# Patient Record
Sex: MALE | Race: WHITE | NOT HISPANIC OR LATINO | Employment: OTHER | URBAN - METROPOLITAN AREA
[De-identification: names, ages, dates, MRNs, and addresses within clinical notes are randomized per-mention and may not be internally consistent; named-entity substitution may affect disease eponyms.]

---

## 2023-07-12 LAB
ALANINE AMINOTRANSFERASE (SGPT) (U/L) IN SER/PLAS: 12 U/L (ref 10–52)
ALBUMIN (G/DL) IN SER/PLAS: 4.4 G/DL (ref 3.4–5)
ALKALINE PHOSPHATASE (U/L) IN SER/PLAS: 60 U/L (ref 33–136)
ANION GAP IN SER/PLAS: 13 MMOL/L (ref 10–20)
APPEARANCE, URINE: CLEAR
ASPARTATE AMINOTRANSFERASE (SGOT) (U/L) IN SER/PLAS: 16 U/L (ref 9–39)
BILIRUBIN TOTAL (MG/DL) IN SER/PLAS: 0.5 MG/DL (ref 0–1.2)
BILIRUBIN, URINE: NEGATIVE
BLOOD, URINE: NEGATIVE
CALCIDIOL (25 OH VITAMIN D3) (NG/ML) IN SER/PLAS: 55 NG/ML
CALCIUM (MG/DL) IN SER/PLAS: 10 MG/DL (ref 8.6–10.6)
CARBON DIOXIDE, TOTAL (MMOL/L) IN SER/PLAS: 30 MMOL/L (ref 21–32)
CHLORIDE (MMOL/L) IN SER/PLAS: 101 MMOL/L (ref 98–107)
CHOLESTEROL (MG/DL) IN SER/PLAS: 156 MG/DL (ref 0–199)
CHOLESTEROL IN HDL (MG/DL) IN SER/PLAS: 83.5 MG/DL
CHOLESTEROL/HDL RATIO: 1.9
COLOR, URINE: YELLOW
CREATININE (MG/DL) IN SER/PLAS: 1 MG/DL (ref 0.5–1.3)
ERYTHROCYTE DISTRIBUTION WIDTH (RATIO) BY AUTOMATED COUNT: 14.8 % (ref 11.5–14.5)
ERYTHROCYTE MEAN CORPUSCULAR HEMOGLOBIN CONCENTRATION (G/DL) BY AUTOMATED: 32.8 G/DL (ref 32–36)
ERYTHROCYTE MEAN CORPUSCULAR VOLUME (FL) BY AUTOMATED COUNT: 93 FL (ref 80–100)
ERYTHROCYTES (10*6/UL) IN BLOOD BY AUTOMATED COUNT: 4.89 X10E12/L (ref 4.5–5.9)
FERRITIN (UG/LL) IN SER/PLAS: 25 UG/L (ref 20–300)
GFR MALE: 80 ML/MIN/1.73M2
GLUCOSE (MG/DL) IN SER/PLAS: 88 MG/DL (ref 74–99)
GLUCOSE, URINE: NEGATIVE MG/DL
HEMATOCRIT (%) IN BLOOD BY AUTOMATED COUNT: 45.7 % (ref 41–52)
HEMOGLOBIN (G/DL) IN BLOOD: 15 G/DL (ref 13.5–17.5)
IRON (UG/DL) IN SER/PLAS: 67 UG/DL (ref 35–150)
IRON BINDING CAPACITY (UG/DL) IN SER/PLAS: 444 UG/DL (ref 240–445)
IRON SATURATION (%) IN SER/PLAS: 15 % (ref 25–45)
KETONES, URINE: NEGATIVE MG/DL
LDL: 62 MG/DL (ref 0–99)
LEUKOCYTE ESTERASE, URINE: NEGATIVE
LEUKOCYTES (10*3/UL) IN BLOOD BY AUTOMATED COUNT: 6.8 X10E9/L (ref 4.4–11.3)
NITRITE, URINE: NEGATIVE
NRBC (PER 100 WBCS) BY AUTOMATED COUNT: 0 /100 WBC (ref 0–0)
PH, URINE: 6 (ref 5–8)
PLATELETS (10*3/UL) IN BLOOD AUTOMATED COUNT: 174 X10E9/L (ref 150–450)
POTASSIUM (MMOL/L) IN SER/PLAS: 5.2 MMOL/L (ref 3.5–5.3)
PROGESTERONE (NG/ML) IN SER/PLAS: 0.8 NG/ML
PROSTATE SPECIFIC AG (NG/ML) IN SER/PLAS: 3.91 NG/ML (ref 0–4)
PROTEIN TOTAL: 6.7 G/DL (ref 6.4–8.2)
PROTEIN, URINE: NEGATIVE MG/DL
RBC, URINE: <1 /HPF (ref 0–5)
SODIUM (MMOL/L) IN SER/PLAS: 139 MMOL/L (ref 136–145)
SPECIFIC GRAVITY, URINE: 1 (ref 1–1.03)
TESTOSTERONE (NG/DL) IN SER/PLAS: 550 NG/DL (ref 240–1000)
TRIGLYCERIDE (MG/DL) IN SER/PLAS: 54 MG/DL (ref 0–149)
UREA NITROGEN (MG/DL) IN SER/PLAS: 10 MG/DL (ref 6–23)
UROBILINOGEN, URINE: <2 MG/DL (ref 0–1.9)
VLDL: 11 MG/DL (ref 0–40)
WBC, URINE: <1 /HPF (ref 0–5)

## 2023-07-13 LAB — URINE CULTURE: NO GROWTH

## 2023-09-06 ENCOUNTER — HOSPITAL ENCOUNTER (OUTPATIENT)
Dept: DATA CONVERSION | Facility: HOSPITAL | Age: 73
Discharge: HOME | End: 2023-09-06
Payer: MEDICARE

## 2023-09-06 DIAGNOSIS — R42 DIZZINESS AND GIDDINESS: ICD-10-CM

## 2024-02-16 DIAGNOSIS — R51.9 NONINTRACTABLE HEADACHE, UNSPECIFIED CHRONICITY PATTERN, UNSPECIFIED HEADACHE TYPE: Primary | ICD-10-CM

## 2024-02-16 RX ORDER — SUMATRIPTAN 6 MG/.5ML
6 INJECTION, SOLUTION SUBCUTANEOUS AS NEEDED
Qty: 2 ML | Refills: 11 | Status: SHIPPED | OUTPATIENT
Start: 2024-02-16

## 2024-02-16 RX ORDER — SUMATRIPTAN 6 MG/.5ML
6 INJECTION, SOLUTION SUBCUTANEOUS AS NEEDED
COMMUNITY
End: 2024-02-16 | Stop reason: SDUPTHER

## 2024-02-27 ENCOUNTER — OFFICE VISIT (OUTPATIENT)
Dept: OTOLARYNGOLOGY | Facility: CLINIC | Age: 74
End: 2024-02-27
Payer: MEDICARE

## 2024-02-27 ENCOUNTER — CLINICAL SUPPORT (OUTPATIENT)
Dept: AUDIOLOGY | Facility: CLINIC | Age: 74
End: 2024-02-27
Payer: MEDICARE

## 2024-02-27 VITALS — WEIGHT: 117.7 LBS | TEMPERATURE: 98.2 F | HEIGHT: 65 IN | BODY MASS INDEX: 19.61 KG/M2

## 2024-02-27 DIAGNOSIS — R42 DIZZINESS: Primary | ICD-10-CM

## 2024-02-27 DIAGNOSIS — H90.3 SENSORINEURAL HEARING LOSS (SNHL) OF BOTH EARS: Primary | ICD-10-CM

## 2024-02-27 DIAGNOSIS — R42 DIZZINESS: ICD-10-CM

## 2024-02-27 DIAGNOSIS — H91.8X3 ASYMMETRICAL HEARING LOSS: ICD-10-CM

## 2024-02-27 DIAGNOSIS — H93.13 TINNITUS OF BOTH EARS: ICD-10-CM

## 2024-02-27 DIAGNOSIS — H90.3 SENSORINEURAL HEARING LOSS (SNHL) OF BOTH EARS: ICD-10-CM

## 2024-02-27 PROCEDURE — 1126F AMNT PAIN NOTED NONE PRSNT: CPT | Performed by: NURSE PRACTITIONER

## 2024-02-27 PROCEDURE — 1159F MED LIST DOCD IN RCRD: CPT | Performed by: NURSE PRACTITIONER

## 2024-02-27 PROCEDURE — 92557 COMPREHENSIVE HEARING TEST: CPT | Performed by: AUDIOLOGIST

## 2024-02-27 PROCEDURE — 1160F RVW MEDS BY RX/DR IN RCRD: CPT | Performed by: NURSE PRACTITIONER

## 2024-02-27 PROCEDURE — 99204 OFFICE O/P NEW MOD 45 MIN: CPT | Performed by: NURSE PRACTITIONER

## 2024-02-27 PROCEDURE — 92550 TYMPANOMETRY & REFLEX THRESH: CPT | Performed by: AUDIOLOGIST

## 2024-02-27 PROCEDURE — 1036F TOBACCO NON-USER: CPT | Performed by: NURSE PRACTITIONER

## 2024-02-27 ASSESSMENT — PATIENT HEALTH QUESTIONNAIRE - PHQ9
2. FEELING DOWN, DEPRESSED OR HOPELESS: NOT AT ALL
SUM OF ALL RESPONSES TO PHQ9 QUESTIONS 1 & 2: 0
1. LITTLE INTEREST OR PLEASURE IN DOING THINGS: NOT AT ALL

## 2024-02-27 NOTE — PROGRESS NOTES
"  ADULT AUDIOMETRIC EVALUATION    Name:  Robb Marie  :  1950  Age:  74 y.o.  Date of Evaluation:  2024    HISTORY:  Reason for visit: Mr. Marie is seen today for an evaluation of hearing in conjunction with seeing Kylie Gonzalez CNP. Patient was accompanied by a family member.    Normal VNG on 23. Last audiogram dated 23 revealed mild to moderate SNHL 2000 Hz-6000 Hz, severe at 8000 Hz in the right ear and moderate SNHL at 3000 Hz through 6000 Hz; Severe at 8000 Hz. Asymmetry noted at 3000 Hz     Patient reports one year history of issues with dizziness, disorientation, blurred vision, dull headache and a feeling of knees going to give out. Typically this starts about 2-3 hours after waking for the day. He reports if he closes his eyes, he gets very imbalanced.    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    EVALUATION:    See Audiogram and Immittance results under \"Media\".    RESULTS:     Otoscopic Evaluation:     RIGHT  Mild amount of non-occluding cerumen    LEFT  Mild amount of non-occluding cerumen    Immittance:   Immittance Measures: 226 Hz          Right Ear: Type A: Normal middle ear function         Left Ear:  Type A: Normal middle ear function    Reflexes and Reflex Decay:    Ipsilateral Reflexes (500-4000 Hz):          Probe/Stimulus Right Ear: present       Probe/Stimulus Left Ear: present    Audiometry:  Test Technique: Standard Audiometry under insert phones.    Reliability: Good     Pure Tone Audiometry:    Right: Mild sensorineural hearing loss 125-3000 Hz falling to moderately-severe through 8000 Hz   Left: Mild sensorineural hearing loss 125-2000 Hz falling to moderately-severe through 8000 Hz       Speech Audiometry (via recorded, 25-words unless noted; M=masked):       Right Ear: Speech Reception Threshold (SRT) was obtained at 30 dBHL  Word Recognition Scores were Excellent (100%) in quiet when words were " presented at 65 dBHL, using the NU-6 2A word list.  Left Ear: Speech Reception Threshold (SRT) was obtained at 25 dBHL  Word Recognition Scores were Excellent (96%) in quiet when words were presented at 65 dBHL, using the NU-6 3A word list.    IMPRESSIONS:  Today's test results suggest normal middle ear function.     Right: Mild sensorineural hearing loss 125-3000 Hz falling to moderately-severe through 8000 Hz with excellent word understanding.   Left: Mild sensorineural hearing loss 125-2000 Hz falling to moderately-severe through 8000 Hz with excellent word understanding.       Compared to previous testing dated 8/29/23, largely unchanged.    PATIENT EDUCATION:   Robb Marie was counseled with regard to the findings.       PLAN:  Follow up with Kylie Gonzalez CNP as directed.  Retest hearing annually; sooner if concerns or changes arise.        LOREN Perez, CCC-A  Clinical Audiologist    Time: 5330-3551    This note was created using speech recognition transcription software. Despite proofreading, several typographical errors might be present that might affect the meaning of the content. Please call with any questions.     Degree of   Hearing Sensitivity dB Range   Within Normal Limits (WNL) 0 - 20   Slight 25   Mild 26 - 40   Moderate 41 - 55   Moderately-Severe 56 - 70   Severe 71 - 90   Profound 91 +     KEY  TM Tympanic Membrane   WNL Within Normal Limits   HA Hearing Aid   SNHL Sensorineural Hearing Loss   CHL Conductive Hearing Loss   NIHL Noise-Induced Hearing Loss   ECV Ear Canal Volume

## 2024-02-27 NOTE — PROGRESS NOTES
"Subjective   Patient ID: Robb Marie is a 74 y.o. male who presents for Dizziness (Year no answer as to why dizziness- start morning for an hour dizzy, disoriented, extreme fatigue, headaches and confusion.).  HPI  This patient is referred for evaluation of  episodic non- vertiginous dizziness. The patient is accompanied by his wife. The approximate duration of his complaints is 1 year.    The patient describes his dizziness as feeling \"like I am falling into a black hole,\" fatigue, headache, confusion, unsteady, \"no equilibrium.\"  Symptoms typically last about 3 hours but have lasted an entire day.  Symptoms seem to occur in the middle of the day and resolved by dinnertime.  This has been a ongoing for 1 year.  Initially, symptoms only occurred 1-2 times per week and now occur daily.  He also reports 1 episode of syncope with fall.  He did not seek medical attention.  When asked about ear pain, headache, phono-photophobia, visual or motion intolerance, sound or pressure induced symptoms, hearing loss, discharge from ear, tinnitus, aural fullness or autophony, the patient admits to history of severe migraine headaches (which seem to resolve on their own about 1 year ago), phono and photophobia, motion intolerance, possible pressure induced dizziness, bilateral tinnitus which fluctuates in severity and occasional difficulty hearing.  Patient reports that he got good relief of his migraines with Imitrex.  His migraines had been an issue for 30+ years.  He had about 1 to 2/week and they gradually went away altogether.  He denies any chronic neck pain or stiffness.  Patient was seen by Dr. Mooney who ordered MRI IAC with and without contrast and VNG testing.  MRI IAC was negative for any mass or lesion.  VNG testing was normal.    When asked about a significant past otological history including history of prior ear surgery, noise exposure, exposure to ototoxic drugs or agents, and/or family history of hearing " loss, the patient admits to recreational noise exposure.    Review of Systems  A comprehensive or 10 points review of the patient´s constitutional, neurological, HEENT, pulmonary, cardiovascular and genito-urinary systems showed only those mentioned in history of present illness.    Objective   Physical Exam  Constitutional: no fever, chills, weight loss or weight gain   General appearance: Appears well, well-nourished, well groomed. No acute distress.   Communication: Normal communication   Psychiatric: Oriented to person, place and time. Normal mood and affect.   Neurologic: Cranial nerves II-XII grossly intact and symmetric bilaterally.   Head and Face:   Head: Atraumatic with no masses, lesions or scarring.   Face: Normal symmetry, no paralysis, synkinesis or facial tic. No scars or deformities.   TMJ: Normal, no trismus.   Eyes: Conjunctiva not edematous or erythematous   Ears: External inspection of ears with no deformity, scars or masses. Bilateral EACs clear and bilateral TMs intact with no signs of effusions     Neck: Normal appearing, symmetric, trachea midline.   Cardiovascular: Examination of peripheral vascular system shows no clubbing or cyanosis.   Respiratory: No respiratory distress increased work of breathing. Inspection of the chest with symmetric chest expansion and normal respiratory effort.   Skin: No rashes in the head or neck  Bedside occulomotor function assessment for ocular pursuits and saccades was normal.  Bilateral head thrust negative  Romberg very unsteady, patient immediately following right  Fukuda unsteady and stumbling, but no turning  Tandem gait unsteady, patient immediately following right  Spring-Hallpike negative bilaterally  My interpretation of the audiogram done today is normal/mild sensorineural hearing loss bilaterally through 2000 Hz sloping to moderate sensorineural hearing loss bilaterally.  There is left greater than right sensorineural asymmetry at 3000 Hz.  Excellent  word recognition scores and normal tympanograms bilaterally.  Assessment/Plan        This patient presents for initial evaluation of chronic acquired dizziness, bilateral/asymmetrical sensorineural hearing loss and bilateral subjective tinnitus.    Reassurance given that otologic exam today is normal.  Audiogram was reviewed in detail.  He is a borderline candidate for hearing amplification.  Previous balance testing and MRI were reviewed.  The physiology of balance control was explained. The likely possible etiologies were reviewed. I believe the patient does not have a peripheral vestibular disorder.  I believe his symptoms are more likely a migraine variant.  We discussed that it is interesting that his migraine headaches seem to resolve around the same time he developed his dizziness.  I recommended dietary modifications for migraines.  We also discussed vestibular therapy which may help.  I am happy to place a referral.  I also recommended following up with our balance neurology partners.  Patient and family are in agreement with the plan.  All questions were answered to patient and family's satisfaction.      45 minutes was spent on this patient´s visit. More than 50% of that time was spent in counseling regarding the possible etiologies, test results, treatment options and coordinating care.    This note was created using speech recognition transcription software. Despite proofreading, several typographical errors might be present that might affect the meaning of the content. Please call with any questions.      DAVID Garcia-CNP 02/27/24 6:12 PM

## 2024-03-06 ENCOUNTER — OFFICE VISIT (OUTPATIENT)
Dept: NEUROLOGY | Facility: CLINIC | Age: 74
End: 2024-03-06
Payer: MEDICARE

## 2024-03-06 VITALS
DIASTOLIC BLOOD PRESSURE: 70 MMHG | HEART RATE: 64 BPM | HEIGHT: 66 IN | RESPIRATION RATE: 12 BRPM | SYSTOLIC BLOOD PRESSURE: 130 MMHG | WEIGHT: 115 LBS | BODY MASS INDEX: 18.48 KG/M2

## 2024-03-06 DIAGNOSIS — R42 DIZZINESS: ICD-10-CM

## 2024-03-06 DIAGNOSIS — I65.23 CAROTID ATHEROSCLEROSIS, BILATERAL: Primary | ICD-10-CM

## 2024-03-06 PROCEDURE — 1160F RVW MEDS BY RX/DR IN RCRD: CPT | Performed by: PSYCHIATRY & NEUROLOGY

## 2024-03-06 PROCEDURE — 1159F MED LIST DOCD IN RCRD: CPT | Performed by: PSYCHIATRY & NEUROLOGY

## 2024-03-06 PROCEDURE — 1036F TOBACCO NON-USER: CPT | Performed by: PSYCHIATRY & NEUROLOGY

## 2024-03-06 PROCEDURE — 99205 OFFICE O/P NEW HI 60 MIN: CPT | Performed by: PSYCHIATRY & NEUROLOGY

## 2024-03-06 PROCEDURE — 1126F AMNT PAIN NOTED NONE PRSNT: CPT | Performed by: PSYCHIATRY & NEUROLOGY

## 2024-03-06 NOTE — PROGRESS NOTES
Chief complaint:    74 year old man with dizziness for one year.  Consulted by Kylie Gonzalez CNP.  PCP Dr. Slater.    HPI:    This is a 74-year-old man with dizziness episodes for approximately one year.  The patient came with his significant other, Holly, here is what they tell me.  He is having episodes of dizziness.  It comes and goes.  It often lasts for several hours.  Not exactly a apoorva dizziness, no actual spinning, more like a feeling of fuzziness or lightheadedness, along with difficulty concentrating.  Often accompanied by visual displays consisting of blurriness out in the peripheral vision or spots.  Patient has had longstanding migraines going back all of his life.  I actually had seen him many years ago for his migraines and we had him begin Imitrex which has been very helpful for him over the years.  The migraines were active up until a few years ago when they markedly improved, he now gets them only once in a great while.  However, with these episodes he sometimes has a dull headache and a couple of times he has tried using Imitrex for the symptoms and it has helped.  After a few hours though the symptoms sometimes come back.  He recently saw ENT, had brain MRI imaging, testing came out okay and so a neurological process for example migraine-related dizziness was contemplated so he was asked to revisit with neurology to get that checked out.  Additionally, the patient indicates that he sometimes takes his pulse ox, he says that usually it runs around 95 to 96%, but during the episode he often gets readings much lower, down around 88% or so.  Denies feeling short of breath.  He is a longtime smoker.      Past medical history is remarkable for 1.  Longstanding migraines, on Imitrex which helps, 2.  Macular degeneration, sees an eye doctor for that.    Current medications reviewed.  He takes eye vitamins and also uses Imitrex injectable as needed for migraines.  Allergies reviewed.    Social history the  "patient has significant-other Holly.  His wife had passed away few years ago from cancer.  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  He does smoke cigarettes about one pack/day, drinks beer at least a few per night.  Family history remarkable for stroke in his father.    /70   Pulse 64   Resp 12   Ht 1.676 m (5' 6\")   Wt 52.2 kg (115 lb)   BMI 18.56 kg/m²     Neurologic Exam     Mental Status   Oriented to person, place, and time.   Level of consciousness: alert    Cranial Nerves   Cranial nerves II through XII intact.     Motor Exam   Muscle bulk: normal  Overall muscle tone: normal    Strength   Strength 5/5 except as noted.     Sensory Exam   Light touch normal.     Gait, Coordination, and Reflexes     Gait  Gait: normal    Coordination   Romberg: negative    Reflexes   Reflexes 2+ except as noted.   Right plantar: normal  Left plantar: normal     I reviewed the following data on the patient:  Reviewed notes from nurse practitioner Kylie Gonzalez in the electronic medical record.  Brain MRI scan reviewed, nonspecific age-related changes, also note made of remote small cerebellar infarcts.    Assessment and Plan:  In summary, this patient is experiencing intermittent episodes of dizziness along with other symptoms as above.  This is on top of a longstanding history of migraine, and a recently negative ENT evaluation.  I suspect that some of the symptoms may well be migraine related.  We discussed the matter.  I would like to obtain a carotid ultrasound exam to rule out carotid stenosis given his risk factors.  I have given him a list of foods that can trigger migraines which he should try " to avoid.  I suggested he begin aspirin 81 mg once daily which can often result in migraine prevention.  We will give that a few weeks and see how things go, and have him back to see me in 3 to 4 weeks for revisit and recheck.  If this regimen fails to relieve his symptoms, we may contemplate beginning a migraine preventative medication, for example topiramate.  Additionally, we talked about the pulse ox readings that he mentioned.  Given his longstanding history of smoking, I very much would like to suggest to him to ask his primary care physician to have that investigated and consider a pulmonary evaluation to rule out pulmonary disease, which could be contributing to some of his symptoms.  He will talk to his primary care physician about that.  I did advise him to stop smoking cigarettes immediately.  Everything was reviewed in detail with the patient and his significant other Holly and they understand and are comfortable with this plan.  All of her questions were answered.  Once again thank you very much nurse practitioner means for allowing me to be a part of the care of your patient.  [id number]/TC>50%      Jake Mercado MD

## 2024-03-06 NOTE — LETTER
March 6, 2024     DAVID Garcia-MILENA  [address]  [facility] [address]    Patient: Robb Marie   YOB: 1950   Date of Visit: 3/6/2024       Dear Dr. Kylie Gonzalez, DAVID-CNP:    Thank you for referring oRbb Marie to me for evaluation. Below are my notes for this consultation.  If you have questions, please do not hesitate to call me. I look forward to following your patient along with you.       Sincerely,     Jake Mercaod MD      CC: No Recipients  ______________________________________________________________________________________    Chief complaint:    74 year old man with dizziness for one year.  Consulted by Kylie Gonzalez CNP.  PCP Dr. Slater.    HPI:    This is a 74-year-old man with dizziness episodes for approximately one year.  The patient came with his significant other, Holly, here is what they tell me.  He is having episodes of dizziness.  It comes and goes.  It often lasts for several hours.  Not exactly a apoorva dizziness, no actual spinning, more like a feeling of fuzziness or lightheadedness, along with difficulty concentrating.  Often accompanied by visual displays consisting of blurriness out in the peripheral vision or spots.  Patient has had longstanding migraines going back all of his life.  I actually had seen him many years ago for his migraines and we had him begin Imitrex which has been very helpful for him over the years.  The migraines were active up until a few years ago when they markedly improved, he now gets them only once in a great while.  However, with these episodes he sometimes has a dull headache and a couple of times he has tried using Imitrex for the symptoms and it has helped.  After a few hours though the symptoms sometimes come back.  He recently saw ENT, had brain MRI imaging, testing came out okay and so a neurological process for example migraine-related dizziness was contemplated so he was asked to revisit with neurology to  "get that checked out.  Additionally, the patient indicates that he sometimes takes his pulse ox, he says that usually it runs around 95 to 96%, but during the episode he often gets readings much lower, down around 88% or so.  Denies feeling short of breath.  He is a longtime smoker.      Past medical history is remarkable for 1.  Longstanding migraines, on Imitrex which helps, 2.  Macular degeneration, sees an eye doctor for that.    Current medications reviewed.  He takes eye vitamins and also uses Imitrex injectable as needed for migraines.  Allergies reviewed.    Social history the patient has significant-other Holly.  His wife had passed away few years ago from cancer.  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  He does smoke cigarettes about one pack/day, drinks beer at least a few per night.  Family history remarkable for stroke in his father.    /70   Pulse 64   Resp 12   Ht 1.676 m (5' 6\")   Wt 52.2 kg (115 lb)   BMI 18.56 kg/m²     Neurologic Exam     Mental Status   Oriented to person, place, and time.   Level of consciousness: alert    Cranial Nerves   Cranial nerves II through XII intact.     Motor Exam   Muscle bulk: normal  Overall muscle tone: normal    Strength   Strength 5/5 except as noted.     Sensory Exam   Light touch normal.     Gait, Coordination, and Reflexes     Gait  Gait: normal    Coordination   Romberg: negative    Reflexes   Reflexes 2+ except as noted.   Right plantar: normal  Left plantar: normal     I reviewed the following data on the patient:  Reviewed notes from nurse practitioner Kylie Gonzalez in the electronic medical record.  Brain MRI scan reviewed, " nonspecific age-related changes, also note made of remote small cerebellar infarcts.    Assessment and Plan:  In summary, this patient is experiencing intermittent episodes of dizziness along with other symptoms as above.  This is on top of a longstanding history of migraine, and a recently negative ENT evaluation.  I suspect that some of the symptoms may well be migraine related.  We discussed the matter.  I would like to obtain a carotid ultrasound exam to rule out carotid stenosis given his risk factors.  I have given him a list of foods that can trigger migraines which he should try to avoid.  I suggested he begin aspirin 81 mg once daily which can often result in migraine prevention.  We will give that a few weeks and see how things go, and have him back to see me in 3 to 4 weeks for revisit and recheck.  If this regimen fails to relieve his symptoms, we may contemplate beginning a migraine preventative medication, for example topiramate.  Additionally, we talked about the pulse ox readings that he mentioned.  Given his longstanding history of smoking, I very much would like to suggest to him to ask his primary care physician to have that investigated and consider a pulmonary evaluation to rule out pulmonary disease, which could be contributing to some of his symptoms.  He will talk to his primary care physician about that.  I did advise him to stop smoking cigarettes immediately.  Everything was reviewed in detail with the patient and his significant other Holly and they understand and are comfortable with this plan.  All of her questions were answered.  Once again thank you very much nurse practitioner means for allowing me to be a part of the care of your patient.  [id number]/TC>50%      Jake Mercado MD

## 2024-03-06 NOTE — PATIENT INSTRUCTIONS
Robb it was a pleasure seeing you today.  I suspect a lot of your symptoms are migraine-related.  Here is what I recommended:    Stop smoking.  Get a carotid ultrasound test, we'll [facility] up for you at [facility].  Avoid certain foods (see list).  Start taking a baby aspirin each day.  Get the coated kind and take with a little food in your stomach.  See me back in 3-4 weeks.  If you're still having trouble we can consider adding a medicine called topiramate.

## 2024-03-11 ENCOUNTER — HOSPITAL ENCOUNTER (OUTPATIENT)
Dept: RADIOLOGY | Facility: HOSPITAL | Age: 74
Discharge: HOME | End: 2024-03-11
Payer: MEDICARE

## 2024-03-11 DIAGNOSIS — I65.23 CAROTID ATHEROSCLEROSIS, BILATERAL: ICD-10-CM

## 2024-03-11 PROCEDURE — 93880 EXTRACRANIAL BILAT STUDY: CPT

## 2024-03-12 ENCOUNTER — TELEPHONE (OUTPATIENT)
Dept: NEUROLOGY | Facility: CLINIC | Age: 74
End: 2024-03-12
Payer: MEDICARE

## 2024-03-14 ENCOUNTER — TELEPHONE (OUTPATIENT)
Dept: NEUROLOGY | Facility: CLINIC | Age: 74
End: 2024-03-14
Payer: MEDICARE

## 2024-03-14 DIAGNOSIS — G43.809 OTHER MIGRAINE WITHOUT STATUS MIGRAINOSUS, NOT INTRACTABLE: Primary | ICD-10-CM

## 2024-03-14 RX ORDER — TOPIRAMATE 25 MG/1
25 TABLET ORAL 2 TIMES DAILY
Qty: 60 TABLET | Refills: 3 | Status: SHIPPED | OUTPATIENT
Start: 2024-03-14 | End: 2025-03-14

## 2024-03-18 DIAGNOSIS — D51.8 VITAMIN B12 DEFICIENCY (DIETARY) ANEMIA: ICD-10-CM

## 2024-03-18 DIAGNOSIS — R07.9 CHEST PAIN, UNSPECIFIED TYPE: ICD-10-CM

## 2024-03-18 DIAGNOSIS — E55.9 VITAMIN D DEFICIENCY: ICD-10-CM

## 2024-03-18 DIAGNOSIS — R93.3 ABNORMAL FINDINGS ON DIAGNOSTIC IMAGING OF OTHER PARTS OF DIGESTIVE TRACT: ICD-10-CM

## 2024-03-18 DIAGNOSIS — E78.5 HYPERLIPIDEMIA, UNSPECIFIED HYPERLIPIDEMIA TYPE: ICD-10-CM

## 2024-03-18 DIAGNOSIS — R97.20 ELEVATED PSA: ICD-10-CM

## 2024-03-18 DIAGNOSIS — D50.9 IRON DEFICIENCY ANEMIA, UNSPECIFIED IRON DEFICIENCY ANEMIA TYPE: ICD-10-CM

## 2024-03-18 DIAGNOSIS — Z00.00 HEALTH CARE MAINTENANCE: ICD-10-CM

## 2024-03-18 DIAGNOSIS — D64.9 ANEMIA, UNSPECIFIED TYPE: ICD-10-CM

## 2024-03-18 DIAGNOSIS — Z78.9 HEALTH MAINTENANCE ALTERATION: Primary | ICD-10-CM

## 2024-03-18 DIAGNOSIS — R97.20 ELEVATED PROSTATE SPECIFIC ANTIGEN (PSA): ICD-10-CM

## 2024-03-19 ENCOUNTER — LAB (OUTPATIENT)
Dept: LAB | Facility: LAB | Age: 74
End: 2024-03-19
Payer: MEDICARE

## 2024-03-19 DIAGNOSIS — E78.5 HYPERLIPIDEMIA, UNSPECIFIED HYPERLIPIDEMIA TYPE: ICD-10-CM

## 2024-03-19 DIAGNOSIS — Z78.9 HEALTH MAINTENANCE ALTERATION: ICD-10-CM

## 2024-03-19 DIAGNOSIS — D64.9 ANEMIA, UNSPECIFIED TYPE: ICD-10-CM

## 2024-03-19 DIAGNOSIS — R97.20 ELEVATED PROSTATE SPECIFIC ANTIGEN (PSA): ICD-10-CM

## 2024-03-19 DIAGNOSIS — R93.3 ABNORMAL FINDINGS ON DIAGNOSTIC IMAGING OF OTHER PARTS OF DIGESTIVE TRACT: ICD-10-CM

## 2024-03-19 DIAGNOSIS — E55.9 VITAMIN D DEFICIENCY: ICD-10-CM

## 2024-03-19 DIAGNOSIS — R07.9 CHEST PAIN, UNSPECIFIED TYPE: ICD-10-CM

## 2024-03-19 DIAGNOSIS — Z00.00 HEALTH CARE MAINTENANCE: ICD-10-CM

## 2024-03-19 DIAGNOSIS — D51.8 VITAMIN B12 DEFICIENCY (DIETARY) ANEMIA: ICD-10-CM

## 2024-03-19 DIAGNOSIS — D50.9 IRON DEFICIENCY ANEMIA, UNSPECIFIED IRON DEFICIENCY ANEMIA TYPE: ICD-10-CM

## 2024-03-19 LAB
25(OH)D3 SERPL-MCNC: 46 NG/ML (ref 30–100)
ALBUMIN SERPL BCP-MCNC: 4.2 G/DL (ref 3.4–5)
ALP SERPL-CCNC: 50 U/L (ref 33–136)
ALT SERPL W P-5'-P-CCNC: 11 U/L (ref 10–52)
ANION GAP SERPL CALC-SCNC: 12 MMOL/L (ref 10–20)
AST SERPL W P-5'-P-CCNC: 14 U/L (ref 9–39)
BASOPHILS # BLD AUTO: 0.06 X10*3/UL (ref 0–0.1)
BASOPHILS NFR BLD AUTO: 0.9 %
BILIRUB SERPL-MCNC: 0.4 MG/DL (ref 0–1.2)
BUN SERPL-MCNC: 12 MG/DL (ref 6–23)
CALCIUM SERPL-MCNC: 8.6 MG/DL (ref 8.6–10.3)
CHLORIDE SERPL-SCNC: 102 MMOL/L (ref 98–107)
CHOLEST SERPL-MCNC: 140 MG/DL (ref 0–199)
CHOLESTEROL/HDL RATIO: 2.2
CK SERPL-CCNC: 72 U/L (ref 0–325)
CO2 SERPL-SCNC: 25 MMOL/L (ref 21–32)
CREAT SERPL-MCNC: 0.83 MG/DL (ref 0.5–1.3)
EGFRCR SERPLBLD CKD-EPI 2021: >90 ML/MIN/1.73M*2
EOSINOPHIL # BLD AUTO: 0.04 X10*3/UL (ref 0–0.4)
EOSINOPHIL NFR BLD AUTO: 0.6 %
ERYTHROCYTE [DISTWIDTH] IN BLOOD BY AUTOMATED COUNT: 17.2 % (ref 11.5–14.5)
FERRITIN SERPL-MCNC: 10 NG/ML (ref 20–300)
FOLATE SERPL-MCNC: 21.1 NG/ML
GLUCOSE SERPL-MCNC: 85 MG/DL (ref 74–99)
HCT VFR BLD AUTO: 31.8 % (ref 41–52)
HDLC SERPL-MCNC: 62.3 MG/DL
HGB BLD-MCNC: 9.3 G/DL (ref 13.5–17.5)
IMM GRANULOCYTES # BLD AUTO: 0.01 X10*3/UL (ref 0–0.5)
IMM GRANULOCYTES NFR BLD AUTO: 0.2 % (ref 0–0.9)
IRON SATN MFR SERPL: 4 % (ref 25–45)
IRON SERPL-MCNC: 20 UG/DL (ref 35–150)
LDH SERPL L TO P-CCNC: 166 U/L (ref 84–246)
LDLC SERPL CALC-MCNC: 66 MG/DL
LYMPHOCYTES # BLD AUTO: 1.36 X10*3/UL (ref 0.8–3)
LYMPHOCYTES NFR BLD AUTO: 21.5 %
MAGNESIUM SERPL-MCNC: 1.88 MG/DL (ref 1.6–2.4)
MCH RBC QN AUTO: 22.4 PG (ref 26–34)
MCHC RBC AUTO-ENTMCNC: 29.2 G/DL (ref 32–36)
MCV RBC AUTO: 76 FL (ref 80–100)
MONOCYTES # BLD AUTO: 0.87 X10*3/UL (ref 0.05–0.8)
MONOCYTES NFR BLD AUTO: 13.8 %
NEUTROPHILS # BLD AUTO: 3.98 X10*3/UL (ref 1.6–5.5)
NEUTROPHILS NFR BLD AUTO: 63 %
NON HDL CHOLESTEROL: 78 MG/DL (ref 0–149)
NRBC BLD-RTO: 0 /100 WBCS (ref 0–0)
PLATELET # BLD AUTO: 246 X10*3/UL (ref 150–450)
POTASSIUM SERPL-SCNC: 4.4 MMOL/L (ref 3.5–5.3)
PROT SERPL-MCNC: 6.3 G/DL (ref 6.4–8.2)
PSA SERPL-MCNC: 4.82 NG/ML
RBC # BLD AUTO: 4.16 X10*6/UL (ref 4.5–5.9)
SODIUM SERPL-SCNC: 135 MMOL/L (ref 136–145)
T4 FREE SERPL-MCNC: 0.87 NG/DL (ref 0.61–1.12)
TESTOST SERPL-MCNC: 679 NG/DL (ref 240–1000)
TIBC SERPL-MCNC: 450 UG/DL (ref 240–445)
TRIGL SERPL-MCNC: 59 MG/DL (ref 0–149)
TSH SERPL-ACNC: 1.37 MIU/L (ref 0.44–3.98)
UIBC SERPL-MCNC: 430 UG/DL (ref 110–370)
VIT B12 SERPL-MCNC: 537 PG/ML (ref 211–911)
VLDL: 12 MG/DL (ref 0–40)
WBC # BLD AUTO: 6.3 X10*3/UL (ref 4.4–11.3)

## 2024-03-19 PROCEDURE — 80053 COMPREHEN METABOLIC PANEL: CPT

## 2024-03-19 PROCEDURE — 85025 COMPLETE CBC W/AUTO DIFF WBC: CPT

## 2024-03-19 PROCEDURE — 84439 ASSAY OF FREE THYROXINE: CPT

## 2024-03-19 PROCEDURE — 82607 VITAMIN B-12: CPT

## 2024-03-19 PROCEDURE — 83615 LACTATE (LD) (LDH) ENZYME: CPT

## 2024-03-19 PROCEDURE — 84443 ASSAY THYROID STIM HORMONE: CPT

## 2024-03-19 PROCEDURE — 36415 COLL VENOUS BLD VENIPUNCTURE: CPT

## 2024-03-19 PROCEDURE — 83550 IRON BINDING TEST: CPT

## 2024-03-19 PROCEDURE — 82728 ASSAY OF FERRITIN: CPT

## 2024-03-19 PROCEDURE — 83735 ASSAY OF MAGNESIUM: CPT

## 2024-03-19 PROCEDURE — 82746 ASSAY OF FOLIC ACID SERUM: CPT

## 2024-03-19 PROCEDURE — 82306 VITAMIN D 25 HYDROXY: CPT

## 2024-03-19 PROCEDURE — 83090 ASSAY OF HOMOCYSTEINE: CPT

## 2024-03-19 PROCEDURE — 82550 ASSAY OF CK (CPK): CPT

## 2024-03-19 PROCEDURE — 84403 ASSAY OF TOTAL TESTOSTERONE: CPT

## 2024-03-19 PROCEDURE — 83540 ASSAY OF IRON: CPT

## 2024-03-19 PROCEDURE — 84153 ASSAY OF PSA TOTAL: CPT

## 2024-03-19 PROCEDURE — 80061 LIPID PANEL: CPT

## 2024-03-20 ENCOUNTER — LAB (OUTPATIENT)
Dept: LAB | Facility: LAB | Age: 74
End: 2024-03-20
Payer: MEDICARE

## 2024-03-20 DIAGNOSIS — Z00.00 HEALTH CARE MAINTENANCE: ICD-10-CM

## 2024-03-20 LAB
APPEARANCE UR: CLEAR
BILIRUB UR STRIP.AUTO-MCNC: NEGATIVE MG/DL
COLOR UR: YELLOW
GLUCOSE UR STRIP.AUTO-MCNC: NEGATIVE MG/DL
HCYS SERPL-SCNC: 11.19 UMOL/L (ref 5–13.9)
HOLD SPECIMEN: NORMAL
KETONES UR STRIP.AUTO-MCNC: NEGATIVE MG/DL
LEUKOCYTE ESTERASE UR QL STRIP.AUTO: NEGATIVE
NITRITE UR QL STRIP.AUTO: NEGATIVE
PH UR STRIP.AUTO: 6 [PH]
PROT UR STRIP.AUTO-MCNC: NEGATIVE MG/DL
RBC # UR STRIP.AUTO: NEGATIVE /UL
SP GR UR STRIP.AUTO: 1.01
UROBILINOGEN UR STRIP.AUTO-MCNC: <2 MG/DL

## 2024-03-20 PROCEDURE — 81003 URINALYSIS AUTO W/O SCOPE: CPT

## 2024-03-21 RX ORDER — FERROUS SULFATE 325(65) MG
325 TABLET ORAL
Qty: 180 TABLET | Refills: 3 | Status: SHIPPED | OUTPATIENT
Start: 2024-03-21 | End: 2024-05-07 | Stop reason: SDUPTHER

## 2024-03-21 RX ORDER — FERROUS SULFATE 325(65) MG
325 TABLET ORAL
COMMUNITY
End: 2024-03-21 | Stop reason: SDUPTHER

## 2024-03-22 ENCOUNTER — TELEPHONE (OUTPATIENT)
Dept: NEUROLOGY | Facility: CLINIC | Age: 74
End: 2024-03-22
Payer: MEDICARE

## 2024-04-01 ENCOUNTER — APPOINTMENT (OUTPATIENT)
Dept: NEUROLOGY | Facility: CLINIC | Age: 74
End: 2024-04-01
Payer: MEDICARE

## 2024-04-01 DIAGNOSIS — R93.1 HIGH CORONARY ARTERY CALCIUM SCORE: Primary | ICD-10-CM

## 2024-04-01 DIAGNOSIS — R94.30 ABNORMAL RESULT OF CARDIOVASCULAR FUNCTION STUDY, UNSPECIFIED: ICD-10-CM

## 2024-04-02 ENCOUNTER — HOSPITAL ENCOUNTER (OUTPATIENT)
Dept: RADIOLOGY | Facility: HOSPITAL | Age: 74
Discharge: HOME | End: 2024-04-02
Payer: MEDICARE

## 2024-04-02 ENCOUNTER — APPOINTMENT (OUTPATIENT)
Dept: RADIOLOGY | Facility: HOSPITAL | Age: 74
End: 2024-04-02
Payer: MEDICARE

## 2024-04-02 DIAGNOSIS — R07.9 CHEST PAIN, UNSPECIFIED TYPE: ICD-10-CM

## 2024-04-02 PROCEDURE — 76705 ECHO EXAM OF ABDOMEN: CPT | Performed by: RADIOLOGY

## 2024-04-02 PROCEDURE — 76706 US ABDL AORTA SCREEN AAA: CPT

## 2024-04-10 DIAGNOSIS — I10 HYPERTENSION, UNSPECIFIED TYPE: Primary | ICD-10-CM

## 2024-04-10 RX ORDER — METOPROLOL SUCCINATE 25 MG/1
25 TABLET, EXTENDED RELEASE ORAL DAILY
Qty: 100 TABLET | Refills: 3 | Status: SHIPPED | OUTPATIENT
Start: 2024-04-10

## 2024-04-10 RX ORDER — METOPROLOL SUCCINATE 25 MG/1
25 TABLET, EXTENDED RELEASE ORAL DAILY
COMMUNITY
End: 2024-04-10 | Stop reason: SDUPTHER

## 2024-04-26 ENCOUNTER — HOSPITAL ENCOUNTER (OUTPATIENT)
Dept: RADIOLOGY | Facility: HOSPITAL | Age: 74
Discharge: HOME | End: 2024-04-26
Payer: MEDICARE

## 2024-04-26 ENCOUNTER — PREP FOR PROCEDURE (OUTPATIENT)
Dept: RADIOLOGY | Facility: HOSPITAL | Age: 74
End: 2024-04-26

## 2024-04-26 VITALS
SYSTOLIC BLOOD PRESSURE: 145 MMHG | OXYGEN SATURATION: 100 % | DIASTOLIC BLOOD PRESSURE: 73 MMHG | HEART RATE: 63 BPM | RESPIRATION RATE: 16 BRPM

## 2024-04-26 DIAGNOSIS — R93.1 HIGH CORONARY ARTERY CALCIUM SCORE: ICD-10-CM

## 2024-04-26 DIAGNOSIS — R94.30 ABNORMAL RESULT OF CARDIOVASCULAR FUNCTION STUDY, UNSPECIFIED: ICD-10-CM

## 2024-04-26 PROCEDURE — 2500000001 HC RX 250 WO HCPCS SELF ADMINISTERED DRUGS (ALT 637 FOR MEDICARE OP): Performed by: RADIOLOGY

## 2024-04-26 PROCEDURE — 2500000005 HC RX 250 GENERAL PHARMACY W/O HCPCS: Performed by: RADIOLOGY

## 2024-04-26 PROCEDURE — 75574 CT ANGIO HRT W/3D IMAGE: CPT

## 2024-04-26 PROCEDURE — 2550000001 HC RX 255 CONTRASTS: Performed by: NURSE PRACTITIONER

## 2024-04-26 PROCEDURE — 75574 CT ANGIO HRT W/3D IMAGE: CPT | Performed by: RADIOLOGY

## 2024-04-26 RX ORDER — METOPROLOL TARTRATE 1 MG/ML
5 INJECTION, SOLUTION INTRAVENOUS ONCE AS NEEDED
Status: CANCELLED | OUTPATIENT
Start: 2024-04-26

## 2024-04-26 RX ORDER — METOPROLOL TARTRATE 1 MG/ML
5 INJECTION, SOLUTION INTRAVENOUS ONCE AS NEEDED
Status: DISCONTINUED | OUTPATIENT
Start: 2024-04-26 | End: 2024-04-27 | Stop reason: HOSPADM

## 2024-04-26 RX ORDER — NITROGLYCERIN 0.4 MG/1
0.8 TABLET SUBLINGUAL ONCE
Status: COMPLETED | OUTPATIENT
Start: 2024-04-26 | End: 2024-04-26

## 2024-04-26 RX ORDER — LORAZEPAM 2 MG/ML
0.5 INJECTION INTRAMUSCULAR EVERY 5 MIN PRN
Status: DISCONTINUED | OUTPATIENT
Start: 2024-04-26 | End: 2024-04-27 | Stop reason: HOSPADM

## 2024-04-26 RX ORDER — METOPROLOL TARTRATE 100 MG/1
100 TABLET ORAL ONCE AS NEEDED
Status: CANCELLED | OUTPATIENT
Start: 2024-04-26

## 2024-04-26 RX ORDER — METOPROLOL TARTRATE 1 MG/ML
5 INJECTION, SOLUTION INTRAVENOUS ONCE
Status: CANCELLED | OUTPATIENT
Start: 2024-04-26 | End: 2024-04-26

## 2024-04-26 RX ORDER — METOPROLOL TARTRATE 1 MG/ML
5 INJECTION, SOLUTION INTRAVENOUS ONCE
Status: COMPLETED | OUTPATIENT
Start: 2024-04-26 | End: 2024-04-26

## 2024-04-26 RX ORDER — METOPROLOL TARTRATE 50 MG/1
100 TABLET ORAL ONCE
Status: DISCONTINUED | OUTPATIENT
Start: 2024-04-26 | End: 2024-04-27 | Stop reason: HOSPADM

## 2024-04-26 RX ORDER — LORAZEPAM 2 MG/ML
0.5 INJECTION INTRAMUSCULAR EVERY 5 MIN PRN
Status: CANCELLED | OUTPATIENT
Start: 2024-04-26

## 2024-04-26 RX ORDER — METOPROLOL TARTRATE 100 MG/1
100 TABLET ORAL ONCE
Status: CANCELLED | OUTPATIENT
Start: 2024-04-26 | End: 2024-04-26

## 2024-04-26 RX ORDER — NITROGLYCERIN 0.4 MG/1
0.8 TABLET SUBLINGUAL ONCE
Status: CANCELLED | OUTPATIENT
Start: 2024-04-26 | End: 2024-04-26

## 2024-04-26 RX ORDER — METOPROLOL TARTRATE 50 MG/1
100 TABLET ORAL ONCE AS NEEDED
Status: DISCONTINUED | OUTPATIENT
Start: 2024-04-26 | End: 2024-04-27 | Stop reason: HOSPADM

## 2024-04-26 RX ADMIN — METOPROLOL TARTRATE 5 MG: 5 INJECTION INTRAVENOUS at 13:27

## 2024-04-26 RX ADMIN — NITROGLYCERIN 0.8 MG: 0.4 TABLET SUBLINGUAL at 13:21

## 2024-04-26 RX ADMIN — IOHEXOL 70 ML: 350 INJECTION, SOLUTION INTRAVENOUS at 13:19

## 2024-05-07 DIAGNOSIS — D64.9 ANEMIA, UNSPECIFIED TYPE: ICD-10-CM

## 2024-05-07 DIAGNOSIS — D50.9 IRON DEFICIENCY ANEMIA, UNSPECIFIED IRON DEFICIENCY ANEMIA TYPE: ICD-10-CM

## 2024-05-07 RX ORDER — FERROUS SULFATE 325(65) MG
325 TABLET ORAL
Qty: 180 TABLET | Refills: 3 | Status: SHIPPED | OUTPATIENT
Start: 2024-05-07

## 2024-05-15 ENCOUNTER — LAB (OUTPATIENT)
Dept: LAB | Facility: LAB | Age: 74
End: 2024-05-15
Payer: MEDICARE

## 2024-05-15 DIAGNOSIS — D64.9 ANEMIA, UNSPECIFIED TYPE: ICD-10-CM

## 2024-05-15 DIAGNOSIS — D50.9 IRON DEFICIENCY ANEMIA, UNSPECIFIED IRON DEFICIENCY ANEMIA TYPE: ICD-10-CM

## 2024-05-15 DIAGNOSIS — R97.20 ELEVATED PSA: ICD-10-CM

## 2024-05-15 PROCEDURE — 82728 ASSAY OF FERRITIN: CPT

## 2024-05-15 PROCEDURE — 84154 ASSAY OF PSA FREE: CPT

## 2024-05-15 PROCEDURE — 83540 ASSAY OF IRON: CPT

## 2024-05-15 PROCEDURE — 83550 IRON BINDING TEST: CPT

## 2024-05-15 PROCEDURE — 84153 ASSAY OF PSA TOTAL: CPT

## 2024-05-15 PROCEDURE — 36415 COLL VENOUS BLD VENIPUNCTURE: CPT

## 2024-05-15 PROCEDURE — 85025 COMPLETE CBC W/AUTO DIFF WBC: CPT

## 2024-05-16 LAB
BASOPHILS # BLD AUTO: 0.05 X10*3/UL (ref 0–0.1)
BASOPHILS NFR BLD AUTO: 0.7 %
BURR CELLS BLD QL SMEAR: NORMAL
EOSINOPHIL # BLD AUTO: 0.01 X10*3/UL (ref 0–0.4)
EOSINOPHIL NFR BLD AUTO: 0.1 %
ERYTHROCYTE [DISTWIDTH] IN BLOOD BY AUTOMATED COUNT: ABNORMAL %
FERRITIN SERPL-MCNC: 71 NG/ML (ref 20–300)
HCT VFR BLD AUTO: 48.4 % (ref 41–52)
HGB BLD-MCNC: 15.8 G/DL (ref 13.5–17.5)
HYPOCHROMIA BLD QL SMEAR: NORMAL
IMM GRANULOCYTES # BLD AUTO: 0.02 X10*3/UL (ref 0–0.5)
IMM GRANULOCYTES NFR BLD AUTO: 0.3 % (ref 0–0.9)
IRON SATN MFR SERPL: 29 % (ref 25–45)
IRON SERPL-MCNC: 105 UG/DL (ref 35–150)
LYMPHOCYTES # BLD AUTO: 1.48 X10*3/UL (ref 0.8–3)
LYMPHOCYTES NFR BLD AUTO: 19.5 %
MCH RBC QN AUTO: 30.4 PG (ref 26–34)
MCHC RBC AUTO-ENTMCNC: 32.6 G/DL (ref 32–36)
MCV RBC AUTO: 93 FL (ref 80–100)
MONOCYTES # BLD AUTO: 0.87 X10*3/UL (ref 0.05–0.8)
MONOCYTES NFR BLD AUTO: 11.5 %
NEUTROPHILS # BLD AUTO: 5.16 X10*3/UL (ref 1.6–5.5)
NEUTROPHILS NFR BLD AUTO: 67.9 %
NRBC BLD-RTO: 0 /100 WBCS (ref 0–0)
OVALOCYTES BLD QL SMEAR: NORMAL
PLATELET # BLD AUTO: 158 X10*3/UL (ref 150–450)
RBC # BLD AUTO: 5.2 X10*6/UL (ref 4.5–5.9)
RBC MORPH BLD: NORMAL
TIBC SERPL-MCNC: 360 UG/DL (ref 240–445)
UIBC SERPL-MCNC: 255 UG/DL (ref 110–370)
WBC # BLD AUTO: 7.6 X10*3/UL (ref 4.4–11.3)

## 2024-05-17 DIAGNOSIS — D50.9 IRON DEFICIENCY ANEMIA, UNSPECIFIED IRON DEFICIENCY ANEMIA TYPE: ICD-10-CM

## 2024-05-17 DIAGNOSIS — R97.20 ELEVATED PSA: ICD-10-CM

## 2024-05-17 DIAGNOSIS — K29.51 CHRONIC GASTRITIS WITH BLEEDING, UNSPECIFIED GASTRITIS TYPE: Primary | ICD-10-CM

## 2024-05-17 DIAGNOSIS — K92.1 BLOOD IN STOOL: ICD-10-CM

## 2024-05-17 LAB
PSA FREE MFR SERPL: 11 %
PSA FREE SERPL-MCNC: 0.6 NG/ML
PSA SERPL IA-MCNC: 5.5 NG/ML (ref 0–4)

## 2024-06-02 ENCOUNTER — HOSPITAL ENCOUNTER (OUTPATIENT)
Dept: RADIOLOGY | Facility: HOSPITAL | Age: 74
End: 2024-06-02
Payer: MEDICARE

## 2024-06-05 DIAGNOSIS — D64.9 ANEMIA, UNSPECIFIED TYPE: ICD-10-CM

## 2024-06-05 DIAGNOSIS — Z00.00 HEALTH CARE MAINTENANCE: ICD-10-CM

## 2024-06-05 DIAGNOSIS — Z12.11 COLON CANCER SCREENING: Primary | ICD-10-CM

## 2024-06-05 DIAGNOSIS — R35.0 URINARY FREQUENCY: ICD-10-CM

## 2024-06-06 DIAGNOSIS — Z12.11 COLON CANCER SCREENING: ICD-10-CM

## 2024-06-06 RX ORDER — POLYETHYLENE GLYCOL 3350, SODIUM CHLORIDE, SODIUM BICARBONATE, POTASSIUM CHLORIDE 420; 11.2; 5.72; 1.48 G/4L; G/4L; G/4L; G/4L
POWDER, FOR SOLUTION ORAL
Qty: 4000 ML | Refills: 0 | Status: SHIPPED | OUTPATIENT
Start: 2024-06-06

## 2024-06-10 ENCOUNTER — LAB (OUTPATIENT)
Dept: LAB | Facility: LAB | Age: 74
End: 2024-06-10
Payer: MEDICARE

## 2024-06-10 DIAGNOSIS — Z00.00 HEALTH CARE MAINTENANCE: ICD-10-CM

## 2024-06-10 DIAGNOSIS — R35.0 URINARY FREQUENCY: ICD-10-CM

## 2024-06-10 LAB
APPEARANCE UR: CLEAR
BILIRUB UR STRIP.AUTO-MCNC: NEGATIVE MG/DL
COLOR UR: NORMAL
GLUCOSE UR STRIP.AUTO-MCNC: NORMAL MG/DL
KETONES UR STRIP.AUTO-MCNC: NEGATIVE MG/DL
LEUKOCYTE ESTERASE UR QL STRIP.AUTO: NEGATIVE
NITRITE UR QL STRIP.AUTO: NEGATIVE
PH UR STRIP.AUTO: 6.5 [PH]
PROT UR STRIP.AUTO-MCNC: NEGATIVE MG/DL
RBC # UR STRIP.AUTO: NEGATIVE /UL
SP GR UR STRIP.AUTO: 1.01
UROBILINOGEN UR STRIP.AUTO-MCNC: NORMAL MG/DL

## 2024-06-10 PROCEDURE — 81003 URINALYSIS AUTO W/O SCOPE: CPT

## 2024-06-10 PROCEDURE — 87086 URINE CULTURE/COLONY COUNT: CPT

## 2024-06-11 ENCOUNTER — LAB (OUTPATIENT)
Dept: LAB | Facility: LAB | Age: 74
End: 2024-06-11
Payer: MEDICARE

## 2024-06-11 DIAGNOSIS — D64.9 ANEMIA, UNSPECIFIED TYPE: ICD-10-CM

## 2024-06-11 DIAGNOSIS — D50.9 IRON DEFICIENCY ANEMIA, UNSPECIFIED IRON DEFICIENCY ANEMIA TYPE: ICD-10-CM

## 2024-06-11 DIAGNOSIS — N40.1 BENIGN PROSTATIC HYPERPLASIA WITH LOWER URINARY TRACT SYMPTOMS: Primary | ICD-10-CM

## 2024-06-11 LAB
BACTERIA UR CULT: NO GROWTH
BASOPHILS # BLD AUTO: 0.05 X10*3/UL (ref 0–0.1)
BASOPHILS NFR BLD AUTO: 0.8 %
EOSINOPHIL # BLD AUTO: 0.05 X10*3/UL (ref 0–0.4)
EOSINOPHIL NFR BLD AUTO: 0.8 %
ERYTHROCYTE [DISTWIDTH] IN BLOOD BY AUTOMATED COUNT: 17.2 % (ref 11.5–14.5)
HCT VFR BLD AUTO: 44.1 % (ref 41–52)
HGB BLD-MCNC: 15.1 G/DL (ref 13.5–17.5)
IMM GRANULOCYTES # BLD AUTO: 0.01 X10*3/UL (ref 0–0.5)
IMM GRANULOCYTES NFR BLD AUTO: 0.2 % (ref 0–0.9)
LYMPHOCYTES # BLD AUTO: 1.17 X10*3/UL (ref 0.8–3)
LYMPHOCYTES NFR BLD AUTO: 19.1 %
MCH RBC QN AUTO: 31.9 PG (ref 26–34)
MCHC RBC AUTO-ENTMCNC: 34.2 G/DL (ref 32–36)
MCV RBC AUTO: 93 FL (ref 80–100)
MONOCYTES # BLD AUTO: 0.82 X10*3/UL (ref 0.05–0.8)
MONOCYTES NFR BLD AUTO: 13.4 %
NEUTROPHILS # BLD AUTO: 4.03 X10*3/UL (ref 1.6–5.5)
NEUTROPHILS NFR BLD AUTO: 65.7 %
NRBC BLD-RTO: 0 /100 WBCS (ref 0–0)
PLATELET # BLD AUTO: 155 X10*3/UL (ref 150–450)
RBC # BLD AUTO: 4.74 X10*6/UL (ref 4.5–5.9)
WBC # BLD AUTO: 6.1 X10*3/UL (ref 4.4–11.3)

## 2024-06-11 PROCEDURE — 83550 IRON BINDING TEST: CPT

## 2024-06-11 PROCEDURE — 83540 ASSAY OF IRON: CPT

## 2024-06-11 PROCEDURE — 84153 ASSAY OF PSA TOTAL: CPT

## 2024-06-11 PROCEDURE — 85025 COMPLETE CBC W/AUTO DIFF WBC: CPT

## 2024-06-11 PROCEDURE — 36415 COLL VENOUS BLD VENIPUNCTURE: CPT

## 2024-06-11 PROCEDURE — 82728 ASSAY OF FERRITIN: CPT

## 2024-06-11 RX ORDER — CIPROFLOXACIN 500 MG/1
500 TABLET ORAL 2 TIMES DAILY
Qty: 10 TABLET | Refills: 0 | Status: SHIPPED | OUTPATIENT
Start: 2024-06-11

## 2024-06-11 RX ORDER — CIPROFLOXACIN 500 MG/1
500 TABLET ORAL 2 TIMES DAILY
COMMUNITY
End: 2024-06-11 | Stop reason: SDUPTHER

## 2024-06-12 LAB
FERRITIN SERPL-MCNC: 57 NG/ML (ref 20–300)
IRON SATN MFR SERPL: 50 % (ref 25–45)
IRON SERPL-MCNC: 160 UG/DL (ref 35–150)
PSA SERPL-MCNC: 4.97 NG/ML
TIBC SERPL-MCNC: 320 UG/DL (ref 240–445)
UIBC SERPL-MCNC: 160 UG/DL (ref 110–370)

## 2024-06-17 ENCOUNTER — HOSPITAL ENCOUNTER (OUTPATIENT)
Dept: RADIOLOGY | Facility: HOSPITAL | Age: 74
Discharge: HOME | End: 2024-06-17
Payer: MEDICARE

## 2024-06-17 DIAGNOSIS — R97.20 ELEVATED PSA: ICD-10-CM

## 2024-06-17 PROCEDURE — 72197 MRI PELVIS W/O & W/DYE: CPT

## 2024-06-17 PROCEDURE — A9575 INJ GADOTERATE MEGLUMI 0.1ML: HCPCS | Performed by: NURSE PRACTITIONER

## 2024-06-17 PROCEDURE — 2550000001 HC RX 255 CONTRASTS: Performed by: NURSE PRACTITIONER

## 2024-06-17 PROCEDURE — 72197 MRI PELVIS W/O & W/DYE: CPT | Performed by: RADIOLOGY

## 2024-06-17 RX ORDER — GADOTERATE MEGLUMINE 376.9 MG/ML
10 INJECTION INTRAVENOUS
Status: COMPLETED | OUTPATIENT
Start: 2024-06-17 | End: 2024-06-17

## 2024-06-17 ASSESSMENT — ENCOUNTER SYMPTOMS
LOSS OF SENSATION IN FEET: 0
OCCASIONAL FEELINGS OF UNSTEADINESS: 0
DEPRESSION: 0

## 2024-06-25 ENCOUNTER — APPOINTMENT (OUTPATIENT)
Dept: RADIOLOGY | Facility: HOSPITAL | Age: 74
End: 2024-06-25
Payer: MEDICARE

## 2024-06-25 DIAGNOSIS — N40.1 BENIGN PROSTATIC HYPERPLASIA WITH LOWER URINARY TRACT SYMPTOMS: ICD-10-CM

## 2024-06-25 PROCEDURE — 76770 US EXAM ABDO BACK WALL COMP: CPT

## 2024-06-25 PROCEDURE — 76770 US EXAM ABDO BACK WALL COMP: CPT | Performed by: RADIOLOGY

## 2024-07-01 ENCOUNTER — APPOINTMENT (OUTPATIENT)
Dept: GASTROENTEROLOGY | Facility: EXTERNAL LOCATION | Age: 74
End: 2024-07-01
Payer: MEDICARE

## 2024-07-01 DIAGNOSIS — D12.0 BENIGN NEOPLASM OF CECUM: ICD-10-CM

## 2024-07-01 DIAGNOSIS — K64.8 INTERNAL HEMORRHOIDS: ICD-10-CM

## 2024-07-01 DIAGNOSIS — D12.3 BENIGN NEOPLASM OF TRANSVERSE COLON: ICD-10-CM

## 2024-07-01 DIAGNOSIS — D64.9 ANEMIA, UNSPECIFIED TYPE: ICD-10-CM

## 2024-07-01 DIAGNOSIS — Z86.010 PERSONAL HISTORY OF COLONIC POLYPS: Primary | ICD-10-CM

## 2024-07-01 DIAGNOSIS — Z12.11 COLON CANCER SCREENING: ICD-10-CM

## 2024-07-01 DIAGNOSIS — K57.30 DIVERTICULOSIS OF LARGE INTESTINE WITHOUT DIVERTICULITIS: ICD-10-CM

## 2024-07-01 PROCEDURE — 88305 TISSUE EXAM BY PATHOLOGIST: CPT

## 2024-07-01 PROCEDURE — 0753T DGTZ GLS MCRSCP SLD LEVEL IV: CPT

## 2024-07-01 PROCEDURE — 88305 TISSUE EXAM BY PATHOLOGIST: CPT | Performed by: PATHOLOGY

## 2024-07-01 PROCEDURE — 45385 COLONOSCOPY W/LESION REMOVAL: CPT | Performed by: INTERNAL MEDICINE

## 2024-07-01 PROCEDURE — 45380 COLONOSCOPY AND BIOPSY: CPT | Performed by: INTERNAL MEDICINE

## 2024-07-02 ENCOUNTER — LAB REQUISITION (OUTPATIENT)
Dept: LAB | Facility: HOSPITAL | Age: 74
End: 2024-07-02
Payer: MEDICARE

## 2024-07-08 ENCOUNTER — APPOINTMENT (OUTPATIENT)
Dept: RADIOLOGY | Facility: HOSPITAL | Age: 74
End: 2024-07-08
Payer: MEDICARE

## 2024-07-11 LAB
LABORATORY COMMENT REPORT: NORMAL
PATH REPORT.FINAL DX SPEC: NORMAL
PATH REPORT.GROSS SPEC: NORMAL
PATH REPORT.RELEVANT HX SPEC: NORMAL
PATH REPORT.TOTAL CANCER: NORMAL

## 2024-07-17 DIAGNOSIS — Z12.11 SCREENING FOR COLON CANCER: Primary | ICD-10-CM

## 2024-08-09 DIAGNOSIS — D50.9 IRON DEFICIENCY ANEMIA, UNSPECIFIED IRON DEFICIENCY ANEMIA TYPE: Primary | ICD-10-CM

## 2024-08-13 ENCOUNTER — LAB (OUTPATIENT)
Dept: LAB | Facility: LAB | Age: 74
End: 2024-08-13
Payer: MEDICARE

## 2024-08-13 DIAGNOSIS — D50.9 IRON DEFICIENCY ANEMIA, UNSPECIFIED IRON DEFICIENCY ANEMIA TYPE: ICD-10-CM

## 2024-08-13 LAB
BASOPHILS # BLD AUTO: 0.05 X10*3/UL (ref 0–0.1)
BASOPHILS NFR BLD AUTO: 0.6 %
EOSINOPHIL # BLD AUTO: 0.02 X10*3/UL (ref 0–0.4)
EOSINOPHIL NFR BLD AUTO: 0.2 %
ERYTHROCYTE [DISTWIDTH] IN BLOOD BY AUTOMATED COUNT: 13 % (ref 11.5–14.5)
FERRITIN SERPL-MCNC: 39 NG/ML (ref 20–300)
HCT VFR BLD AUTO: 39.6 % (ref 41–52)
HGB BLD-MCNC: 13.4 G/DL (ref 13.5–17.5)
IMM GRANULOCYTES # BLD AUTO: 0.03 X10*3/UL (ref 0–0.5)
IMM GRANULOCYTES NFR BLD AUTO: 0.3 % (ref 0–0.9)
IRON SATN MFR SERPL: 38 % (ref 25–45)
IRON SERPL-MCNC: 137 UG/DL (ref 35–150)
LYMPHOCYTES # BLD AUTO: 1.25 X10*3/UL (ref 0.8–3)
LYMPHOCYTES NFR BLD AUTO: 14.6 %
MCH RBC QN AUTO: 33 PG (ref 26–34)
MCHC RBC AUTO-ENTMCNC: 33.8 G/DL (ref 32–36)
MCV RBC AUTO: 98 FL (ref 80–100)
MONOCYTES # BLD AUTO: 0.82 X10*3/UL (ref 0.05–0.8)
MONOCYTES NFR BLD AUTO: 9.5 %
NEUTROPHILS # BLD AUTO: 6.42 X10*3/UL (ref 1.6–5.5)
NEUTROPHILS NFR BLD AUTO: 74.8 %
NRBC BLD-RTO: 0 /100 WBCS (ref 0–0)
PLATELET # BLD AUTO: 188 X10*3/UL (ref 150–450)
RBC # BLD AUTO: 4.06 X10*6/UL (ref 4.5–5.9)
TIBC SERPL-MCNC: 364 UG/DL (ref 240–445)
UIBC SERPL-MCNC: 227 UG/DL (ref 110–370)
WBC # BLD AUTO: 8.6 X10*3/UL (ref 4.4–11.3)

## 2024-08-13 PROCEDURE — 83540 ASSAY OF IRON: CPT

## 2024-08-13 PROCEDURE — 36415 COLL VENOUS BLD VENIPUNCTURE: CPT

## 2024-08-13 PROCEDURE — 85025 COMPLETE CBC W/AUTO DIFF WBC: CPT

## 2024-08-13 PROCEDURE — 82728 ASSAY OF FERRITIN: CPT

## 2024-08-13 PROCEDURE — 83550 IRON BINDING TEST: CPT

## 2024-09-09 ENCOUNTER — LAB (OUTPATIENT)
Dept: LAB | Facility: LAB | Age: 74
End: 2024-09-09
Payer: MEDICARE

## 2024-09-09 DIAGNOSIS — R10.9 UNSPECIFIED ABDOMINAL PAIN: Primary | ICD-10-CM

## 2024-09-09 DIAGNOSIS — N40.1 BENIGN PROSTATIC HYPERPLASIA WITH URINARY FREQUENCY: Primary | ICD-10-CM

## 2024-09-09 DIAGNOSIS — R04.0 NASAL BLEEDING: ICD-10-CM

## 2024-09-09 DIAGNOSIS — R35.0 BENIGN PROSTATIC HYPERPLASIA WITH URINARY FREQUENCY: Primary | ICD-10-CM

## 2024-09-09 LAB
ALBUMIN SERPL BCP-MCNC: 4.2 G/DL (ref 3.4–5)
ALP SERPL-CCNC: 56 U/L (ref 33–136)
ALT SERPL W P-5'-P-CCNC: 12 U/L (ref 10–52)
ANION GAP SERPL CALC-SCNC: 8 MMOL/L (ref 10–20)
AST SERPL W P-5'-P-CCNC: 15 U/L (ref 9–39)
BASOPHILS # BLD AUTO: 0.07 X10*3/UL (ref 0–0.1)
BASOPHILS NFR BLD AUTO: 1 %
BILIRUB DIRECT SERPL-MCNC: 0.1 MG/DL (ref 0–0.3)
BILIRUB SERPL-MCNC: 0.3 MG/DL (ref 0–1.2)
BUN SERPL-MCNC: 14 MG/DL (ref 6–23)
CALCIUM SERPL-MCNC: 9.6 MG/DL (ref 8.6–10.6)
CHLORIDE SERPL-SCNC: 101 MMOL/L (ref 98–107)
CO2 SERPL-SCNC: 32 MMOL/L (ref 21–32)
CREAT SERPL-MCNC: 0.85 MG/DL (ref 0.5–1.3)
CRP SERPL-MCNC: 0.17 MG/DL
EGFRCR SERPLBLD CKD-EPI 2021: >90 ML/MIN/1.73M*2
EOSINOPHIL # BLD AUTO: 0.07 X10*3/UL (ref 0–0.4)
EOSINOPHIL NFR BLD AUTO: 1 %
ERYTHROCYTE [DISTWIDTH] IN BLOOD BY AUTOMATED COUNT: 12.6 % (ref 11.5–14.5)
FERRITIN SERPL-MCNC: 20 NG/ML (ref 20–300)
GLUCOSE SERPL-MCNC: 87 MG/DL (ref 74–99)
HCT VFR BLD AUTO: 36.6 % (ref 41–52)
HGB BLD-MCNC: 12.2 G/DL (ref 13.5–17.5)
IMM GRANULOCYTES # BLD AUTO: 0.02 X10*3/UL (ref 0–0.5)
IMM GRANULOCYTES NFR BLD AUTO: 0.3 % (ref 0–0.9)
LYMPHOCYTES # BLD AUTO: 1.43 X10*3/UL (ref 0.8–3)
LYMPHOCYTES NFR BLD AUTO: 20.2 %
MCH RBC QN AUTO: 31.9 PG (ref 26–34)
MCHC RBC AUTO-ENTMCNC: 33.3 G/DL (ref 32–36)
MCV RBC AUTO: 96 FL (ref 80–100)
MONOCYTES # BLD AUTO: 1.1 X10*3/UL (ref 0.05–0.8)
MONOCYTES NFR BLD AUTO: 15.5 %
NEUTROPHILS # BLD AUTO: 4.4 X10*3/UL (ref 1.6–5.5)
NEUTROPHILS NFR BLD AUTO: 62 %
NRBC BLD-RTO: 0 /100 WBCS (ref 0–0)
PLATELET # BLD AUTO: 207 X10*3/UL (ref 150–450)
POTASSIUM SERPL-SCNC: 5.4 MMOL/L (ref 3.5–5.3)
PROT SERPL-MCNC: 6.1 G/DL (ref 6.4–8.2)
RBC # BLD AUTO: 3.82 X10*6/UL (ref 4.5–5.9)
SODIUM SERPL-SCNC: 136 MMOL/L (ref 136–145)
WBC # BLD AUTO: 7.1 X10*3/UL (ref 4.4–11.3)

## 2024-09-09 PROCEDURE — 36415 COLL VENOUS BLD VENIPUNCTURE: CPT

## 2024-09-09 RX ORDER — TAMSULOSIN HYDROCHLORIDE 0.4 MG/1
0.4 CAPSULE ORAL DAILY
Qty: 30 CAPSULE | Refills: 11 | Status: SHIPPED | OUTPATIENT
Start: 2024-09-09

## 2024-09-09 RX ORDER — TAMSULOSIN HYDROCHLORIDE 0.4 MG/1
0.4 CAPSULE ORAL DAILY
COMMUNITY
End: 2024-09-09 | Stop reason: SDUPTHER

## 2024-10-15 ENCOUNTER — LAB (OUTPATIENT)
Dept: LAB | Facility: LAB | Age: 74
End: 2024-10-15
Payer: MEDICARE

## 2024-10-15 DIAGNOSIS — R53.83 OTHER FATIGUE: ICD-10-CM

## 2024-10-15 DIAGNOSIS — E71.39: Primary | ICD-10-CM

## 2024-10-15 PROCEDURE — 84591 ASSAY OF NOS VITAMIN: CPT

## 2024-10-15 PROCEDURE — 36415 COLL VENOUS BLD VENIPUNCTURE: CPT

## 2024-10-21 LAB
NIACIN SERPL-MCNC: NORMAL NG/ML
NICOTINAMIDE SERPL-MCNC: 13 NG/ML
NICOTINURATE SERPL-MCNC: NORMAL NG/ML

## 2025-05-07 ENCOUNTER — OFFICE VISIT (OUTPATIENT)
Dept: OTOLARYNGOLOGY | Facility: CLINIC | Age: 75
End: 2025-05-07
Payer: MEDICARE

## 2025-05-07 VITALS — HEIGHT: 66 IN | WEIGHT: 116 LBS | BODY MASS INDEX: 18.64 KG/M2

## 2025-05-07 DIAGNOSIS — H90.3 SENSORINEURAL HEARING LOSS (SNHL) OF BOTH EARS: ICD-10-CM

## 2025-05-07 DIAGNOSIS — H93.13 TINNITUS OF BOTH EARS: ICD-10-CM

## 2025-05-07 DIAGNOSIS — R42 DIZZINESS AND GIDDINESS: Primary | ICD-10-CM

## 2025-05-07 DIAGNOSIS — R42 DIZZINESS: ICD-10-CM

## 2025-05-07 PROCEDURE — 3008F BODY MASS INDEX DOCD: CPT | Performed by: OTOLARYNGOLOGY

## 2025-05-07 PROCEDURE — 99214 OFFICE O/P EST MOD 30 MIN: CPT | Performed by: OTOLARYNGOLOGY

## 2025-05-07 PROCEDURE — 1036F TOBACCO NON-USER: CPT | Performed by: OTOLARYNGOLOGY

## 2025-05-07 PROCEDURE — 1159F MED LIST DOCD IN RCRD: CPT | Performed by: OTOLARYNGOLOGY

## 2025-05-07 RX ORDER — METOPROLOL SUCCINATE 100 MG/1
100 TABLET, EXTENDED RELEASE ORAL DAILY
COMMUNITY

## 2025-05-07 RX ORDER — TOPIRAMATE 25 MG/1
25 TABLET ORAL DAILY
Qty: 49 TABLET | Refills: 0 | Status: SHIPPED | OUTPATIENT
Start: 2025-05-07 | End: 2026-05-07

## 2025-05-07 NOTE — PROGRESS NOTES
"HPI  Robb Marie is a 75 y.o. male with a history of dizziness that is well-documented in notes by Dr. Mooney, Kylie Gonzalez and Dr. Mercado.  This been bothering him for about 2 years now.  The working diagnosis is vestibular migraine related dizziness and he continues to have symptoms despite being very restrictive on his diet.  There was discussion of initiating topiramate.  He has a long history of traditional migraines that resolved about 2 years ago and then subsequent to this he has been having dizziness associated with weakness on a near daily basis but without headaches.  He has a history of bilateral sensorineural hearing loss, symmetric, no change in this.  He has longstanding bilateral tinnitus, nonpulsatile      Medical History[1]         Medications:   Current Medications[2]     Allergies:  Allergies[3]     Physical Exam:  Last Recorded Vitals  Height 1.676 m (5' 6\"), weight 52.6 kg (116 lb).  General:     General appearance: Well-developed, well-nourished in no acute distress.       Voice:  normal       Head/face: Normal appearance; nontender to palpation     Facial nerve function: Normal and symmetric bilaterally.    Oral/oropharynx:     Oral vestibule: Normal labial and gingival mucosa     Tongue/floor of mouth: Normal without lesion     Oropharynx: Clear.  No lesions present of the hard/soft palate, posterior pharynx    Neck:     Neck: Normal appearance, trachea midline     Salivary glands: Normal to palpation bilaterally     Lymph nodes: No cervical lymphadenopathy to palpation     Thyroid: No thyromegaly.  No palpable nodules     Range of motion: Normal    Neurological:     Cortical functions: Alert and oriented x3, appropriate affect       Larynx/hypopharynx:     Laryngeal findings: Mirror exam inadequate or limited secondary to enlarged base of tongue and/or excessive gagging    Ear:     Ear canal: Normal bilaterally     Tympanic membrane: Intact and mobile bilaterally     Pinna: " Normal bilaterally     Hearing:  Gross hearing assessment normal by voice    Nose:     Visualized using: Anterior rhinoscopy     Nasopharynx: Inadequate mirror exam secondary to gag, anatomy.       Nasal dorsum: Nontraumatic midline appearance     Septum: Midline     Inferior turbinates: Normally sized     Mucosa: Bilateral, pink, normal appearing       ASSESSMENT/PLAN:  He has a history of negative VNG, negative MRI and continued symptoms.  At this point I have recommended that he reengage neurology but in the meantime we will start him on topiramate 25 mg/day.  The side effects were carefully discussed and he will take appropriate precautions.  He will update us in a week and we will titrate provided that he continues to tolerate.  Hearing aid evaluation and tinnitus protocol reviewed        Kris Collins MD       [1] History reviewed. No pertinent past medical history.  [2]   Current Outpatient Medications:     metoprolol succinate XL (Toprol-XL) 100 mg 24 hr tablet, Take 1 tablet (100 mg) by mouth once daily. Do not crush or chew., Disp: , Rfl:     ferrous sulfate, 325 mg ferrous sulfate, tablet, Take 1 tablet by mouth 2 times a day before meals., Disp: 180 tablet, Rfl: 3    metoprolol succinate XL (Toprol-XL) 25 mg 24 hr tablet, Take 1 tablet (25 mg) by mouth once daily. Do not crush or chew. Take 50 mg for 7 days, then decrease dose to 25 mg daily, Disp: 100 tablet, Rfl: 3    polyethylene glycol-electrolytes (Nulytely) 420 gram solution, Drink 1/2 starting at 6 pm the night before your procedure then drink the 2nd 1/2 5 hours before procedure arrival time, Disp: 4000 mL, Rfl: 0    SUMAtriptan (Imitrex) 6 mg/0.5 mL, Inject 0.5 mL (6 mg) under the skin if needed for migraine., Disp: 2 mL, Rfl: 11    tamsulosin (Flomax) 0.4 mg 24 hr capsule, Take 1 capsule (0.4 mg) by mouth once daily., Disp: 30 capsule, Rfl: 11  [3] No Known Allergies

## 2025-07-07 ENCOUNTER — HOSPITAL ENCOUNTER (OUTPATIENT)
Dept: NEUROLOGY | Facility: CLINIC | Age: 75
Discharge: HOME | End: 2025-07-07
Payer: MEDICARE

## 2025-07-07 DIAGNOSIS — H81.4 VERTIGO OF CENTRAL ORIGIN: ICD-10-CM

## 2025-07-07 PROCEDURE — 95924 ANS PARASYMP & SYMP W/TILT: CPT | Performed by: PSYCHIATRY & NEUROLOGY

## 2025-07-07 PROCEDURE — 95923 AUTONOMIC NRV SYST FUNJ TEST: CPT | Performed by: PSYCHIATRY & NEUROLOGY

## 2025-07-23 ENCOUNTER — APPOINTMENT (OUTPATIENT)
Dept: SLEEP MEDICINE | Facility: CLINIC | Age: 75
End: 2025-07-23
Payer: MEDICARE